# Patient Record
Sex: MALE | Race: WHITE | Employment: FULL TIME | URBAN - METROPOLITAN AREA
[De-identification: names, ages, dates, MRNs, and addresses within clinical notes are randomized per-mention and may not be internally consistent; named-entity substitution may affect disease eponyms.]

---
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
## 2021-12-18 ENCOUNTER — APPOINTMENT (OUTPATIENT)
Dept: GENERAL RADIOLOGY | Age: 21
End: 2021-12-18

## 2021-12-18 ENCOUNTER — HOSPITAL ENCOUNTER (EMERGENCY)
Age: 21
Discharge: HOME | End: 2021-12-18

## 2021-12-18 VITALS
DIASTOLIC BLOOD PRESSURE: 67 MMHG | HEIGHT: 70 IN | HEART RATE: 55 BPM | OXYGEN SATURATION: 100 % | BODY MASS INDEX: 30.06 KG/M2 | WEIGHT: 210 LBS | TEMPERATURE: 98.7 F | SYSTOLIC BLOOD PRESSURE: 131 MMHG | RESPIRATION RATE: 18 BRPM

## 2021-12-18 DIAGNOSIS — S61.512A LACERATION OF LEFT WRIST, INITIAL ENCOUNTER: Primary | ICD-10-CM

## 2021-12-18 PROCEDURE — 90471 IMMUNIZATION ADMIN: CPT | Performed by: NURSE PRACTITIONER

## 2021-12-18 PROCEDURE — 12001 RPR S/N/AX/GEN/TRNK 2.5CM/<: CPT

## 2021-12-18 PROCEDURE — 6360000002 HC RX W HCPCS: Performed by: NURSE PRACTITIONER

## 2021-12-18 PROCEDURE — 73110 X-RAY EXAM OF WRIST: CPT

## 2021-12-18 PROCEDURE — 90715 TDAP VACCINE 7 YRS/> IM: CPT | Performed by: NURSE PRACTITIONER

## 2021-12-18 PROCEDURE — 99283 EMERGENCY DEPT VISIT LOW MDM: CPT

## 2021-12-18 RX ADMIN — TETANUS TOXOID, REDUCED DIPHTHERIA TOXOID AND ACELLULAR PERTUSSIS VACCINE, ADSORBED 0.5 ML: 5; 2.5; 8; 8; 2.5 SUSPENSION INTRAMUSCULAR at 00:52

## 2021-12-18 ASSESSMENT — PAIN SCALES - GENERAL: PAINLEVEL_OUTOF10: 3

## 2021-12-20 NOTE — ED PROVIDER NOTES
[address] [place] [address]  ED  EMERGENCY DEPARTMENT ENCOUNTER      This patient was not seen and evaluated by the attending physician. Pt Name: Deidra Reyes  MRN: [id number]  [place] 2000  Date of evaluation: 12/18/2021  Provider: MAUDE Bear CNP-C  PCP: No primary care provider on file. History provided by the patient    CHIEFCOMPLAINT:     Chief Complaint   Patient presents with    Laceration     Inside L wrist while doing dishes, bleeding controlled       HISTORY OF PRESENT ILLNESS:      Deidra Reyes is a 24 y.o. male who presents to [address] [place] [address]  ED with complaints of a laceration to his left wrist.  Patient states that he was doing dishes when a glass broke causing a laceration. Patient has a 2 cm laceration at the base of his left wrist along the palmar surface. LOCATION:wrist left  QUALITY:ache  SEVERITY:3  DURATION:today  MODIFYING FACTORS:none noted    Nursing Notes were reviewed     REVIEW OF SYSTEMS:     Review of Systems  All systems, a total of 10, are reviewed and negative except for those that were just noted in history present illness. 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 ([facility] [address]     Seizure disorder ([facility] [address]          SURGICAL HISTORY:      Past Surgical History:   Procedure Laterality Date    BRAIN MENINGIOMA EXCISION  11/2017    subtotal         CURRENT MEDICATIONS:       Discharge Medication List as of 12/18/2021 12:47 AM      CONTINUE these medications which have NOT CHANGED    Details   levETIRAcetam (KEPPRA) 500 MG tablet Take 500 mg by mouth 2 times dailyHistorical Med               ALLERGIES:    Seasonal and Shellfish-derived products    FAMILY HISTORY:     History reviewed. No pertinent family history.        SOCIAL HISTORY:     Social History     Socioeconomic History    Marital status: Single     Spouse name: None    Number of children: None    Years of education: None    Highest education level: None   Occupational History    None   Tobacco Use    Smoking status: Never Smoker    Smokeless tobacco: Never Used   Substance and Sexual Activity    Alcohol use: Yes    Drug use: Never    Sexual activity: None   Other Topics Concern    None   Social History Narrative    None     Social Determinants of Health     Financial Resource Strain:     Difficulty of Paying Living Expenses: Not on file   Food Insecurity:     Worried About Running Out of Food in the Last Year: Not on file    Brian of Food in the Last Year: Not on file   Transportation Needs:     Lack of Transportation (Medical): Not on file    Lack of Transportation (Non-Medical):  Not on file   Physical Activity:     Days of Exercise per Week: Not on file    Minutes of Exercise per Session: Not on file   Stress:     Feeling of Stress : Not on file   Social Connections:     Frequency of Communication with Friends and Family: Not on file    Frequency of Social Gatherings with Friends and Family: Not on file    Attends Congregational Services: Not on file    Active Member of Clubs or Organizations: Not on file    Attends Club or Organization Meetings: Not on file    Marital Status: Not on file   Intimate Partner Violence:     Fear of Current or Ex-Partner: Not on file    Emotionally Abused: Not on file    Physically Abused: Not on file    Sexually Abused: Not on file   Housing Stability:     Unable to Pay for Housing in the Last Year: Not on file    Number of [place] in the Last Year: Not on file    Unstable Housing in the Last Year: Not on file       SCREENINGS:    Dileep Coma Scale  Eye Opening: Spontaneous  Best Verbal Response: Oriented  Best Motor Response: Obeys commands  [place] Coma Scale Score: 15        PHYSICAL EXAM:       ED Triage Vitals [12/18/21 0021]   BP Temp Temp Source Pulse Resp SpO2 Height Weight   131/67 98.7 °F (37.1 °C) Oral 55 18 100 % 5' 10\" (1.778 m) 210 lb (95.3 kg)       Physical Exam    CONSTITUTIONAL: Awake and alert. Cooperative. Well-developed. Well-nourished. Vitals:    12/18/21 0021   BP: 131/67   Pulse: 55   Resp: 18   Temp: 98.7 °F (37.1 °C)   TempSrc: Oral   SpO2: 100%   Weight: 210 lb (95.3 kg)   Height: 5' 10\" (1.778 m)     HENT: Normocephalic. Atraumatic. External ears normal, without discharge. Nonasal discharge. Mucous membranes moist.  EYES: Conjunctiva non-injected, no lid abnormalities noted. No scleral icterus. EOM's grossly intact. Anterior chambers clear. NECK: Supple. Normal ROM. No meningismus. No thyroid tenderness or swelling noted. CARDIOVASCULAR: no tachycardia per vital signs. PULMONARY/CHEST WALL: Effort normal. No tachypnea. No audible adventitious breath sounds. ABDOMEN: No obvious abdominal distention, no obvious hernias. Back: Spine is midline. No obvious trauma or outward signs of cauda equina  /ANORECTAL: Not assessed  MUSKULOSKELETAL: Normal ROM. No acute deformities. No edema. SKIN: Warm and dry. 2 cm laceration noted to the left wrist.  Bleeding is controlled. Wound superficial nature. NEUROLOGICAL:  GCS 15. No obvious focal neurological deficits. PSYCHIATRIC: Normal affect, normal insight and judgement. Alert and oriented x 3. DIAGNOSTIC RESULTS:     LABS:    No results found for this visit on 12/18/21. RADIOLOGY:  All x-ray studies are viewed/reviewed by me. Formal interpretations per the radiologist are as follows:      XR WRIST LEFT (MIN 3 VIEWS)   Final Result   No acute pathology. EKG:  See EKG interpretation by an attending physician. PROCEDURES:   PROCEDURE:  LACERATION REPAIR  Maggie Bustos or their surrogate had an opportunity to ask questions, and the risks, benefits, and alternatives were discussed. The laceration is 2cm in length. The wound was prepped and draped to maintain a sterile field. A local anesthetic was used to completely anesthetize the wound.  It was copiously irrigated. It was explored to its depth in a bloodless field with no sign of tendon, nerve, or vascular injury. No foreign bodies were identified. It was closed with 5-0 ethilon. There were no complications during the procedure. CRITICAL CARE TIME:       CONSULTS:  None      EMERGENCY DEPARTMENT COURSE andDIFFERENTIAL DIAGNOSIS/MDM:   Vitals:    Vitals:    12/18/21 0021   BP: 131/67   Pulse: 55   Resp: 18   Temp: 98.7 °F (37.1 °C)   TempSrc: Oral   SpO2: 100%   Weight: 210 lb (95.3 kg)   Height: 5' 10\" (1.778 m)       Patient wasgiven the following medications:  Medications   Tetanus-Diphth-Acell Pertussis (BOOSTRIX) injection 0.5 mL (0.5 mLs IntraMUSCular Given 12/18/21 0052)         Patient was evaluated independently by myself with the attending physician available for consultation. Patient presented to the emergency department today with complaints of a laceration to his left wrist.  Laceration was cleansed and closed, see procedure note. Radiographic imaging showed no evidence of retained foreign body. Patient had tetanus shot update tetanus status. Patient discharged home in good condition. Patient laboratory studies, radiographic imaging, and assessment were all discussed with the patient and/orpatient family. There was shared decision-making between myself as well as the patient and/or their surrogate and we are all in agreement with discharge home. There was an opportunity for questions and all questions were answered tothe best of my ability and to the satisfaction of the patient and/or patient family. FINAL IMPRESSION:      1.  Laceration of left wrist, initial encounter          DISPOSITION/PLAN:   DISPOSITION Decision To Discharge      PATIENT REFERRED TO:  [facility]  ED  [address]  Go in 10 days  For suture removal      DISCHARGE MEDICATIONS:  Discharge Medication List as of 12/18/2021 12:47 AM (Please note thatportions of this note were completed with a voice recognition program.  Efforts were made to edit the dictations, but occasionally words are mis-transcribed.)    MAUDE Clement - CNP-C (electronicallysigned)      MAUDE Clement CNP  12/20/21 1876

## 2024-01-09 NOTE — PROGRESS NOTES
Here for f/u, pt is about 3wks out from brain surgery for removal of tumor. About 3-4 months ago, Pt started with seizures, and was going through workup and evauation and saw neurology and during that evaluation, 10/24. Pt had MRI showing large meningioma. Pt was admitted from the MRI and was eval by neurology and neurosurgery, as well as heme/onc. Pt was d/c home and then had f/u for surgical intervention. Pt was in the hospital for about 3-4d and was seen afterward. Plan was pending on path, which showed grade II meningioma. Had surgery that was not completely resolved, and is set up to start proton therapy, goal is for start 2/2. Pt will have [facility] at [facility]. They are dealing with insurance issues and need referral in order for pt to proceed with treatment. Pt is sophomore at school, currently at [employer]. Pt has been back at school, missed a total of 2-3 weeks. They have been great at the school to keep him on track, and will catch up with everyhing over winter break. Mood doing well    Except as noted above in the history of present illness, the review of systems is  negative for headache, vision changes, chest pain, shortness of breath, abdominal pain, urinary sx, bowel changes. Past medical, surgical, and social history reviewed and updated  Medications and allergies reviewed and updated       O: /59 (Site: Left Arm, Position: Sitting, Cuff Size: Large Adult)   Pulse 70   Ht 5' 10.8\" (1.798 m)   Wt (!) 243 lb (110.2 kg)   SpO2 98%   BMI 34.08 kg/m²   GEN: No acute distress, cooperative, well nourished, alert. CV: Regular rate and rhythm, no murmur, rubs, gallops. No edema. Resp: Clear to auscultation bilaterally good air entry bilaterally  No crackles, wheeze. Breathing comfortably. Neuro: cranial nerves II-XII intact. Gait within normal limits.   Sensation intact, strength 5/5 bilateral upper extremities, bilateral lower personal belongings